# Patient Record
Sex: MALE | Race: OTHER | HISPANIC OR LATINO | URBAN - METROPOLITAN AREA
[De-identification: names, ages, dates, MRNs, and addresses within clinical notes are randomized per-mention and may not be internally consistent; named-entity substitution may affect disease eponyms.]

---

## 2019-11-04 ENCOUNTER — EMERGENCY (EMERGENCY)
Facility: HOSPITAL | Age: 49
LOS: 0 days | Discharge: HOME | End: 2019-11-04
Attending: EMERGENCY MEDICINE | Admitting: EMERGENCY MEDICINE
Payer: COMMERCIAL

## 2019-11-04 VITALS
OXYGEN SATURATION: 98 % | SYSTOLIC BLOOD PRESSURE: 174 MMHG | RESPIRATION RATE: 20 BRPM | HEART RATE: 76 BPM | TEMPERATURE: 98 F | DIASTOLIC BLOOD PRESSURE: 91 MMHG

## 2019-11-04 DIAGNOSIS — M25.522 PAIN IN LEFT ELBOW: ICD-10-CM

## 2019-11-04 DIAGNOSIS — M25.422 EFFUSION, LEFT ELBOW: ICD-10-CM

## 2019-11-04 DIAGNOSIS — M79.603 PAIN IN ARM, UNSPECIFIED: ICD-10-CM

## 2019-11-04 PROCEDURE — 99283 EMERGENCY DEPT VISIT LOW MDM: CPT

## 2019-11-04 PROCEDURE — 73080 X-RAY EXAM OF ELBOW: CPT | Mod: 26,LT

## 2019-11-04 NOTE — ED PROVIDER NOTE - OBJECTIVE STATEMENT
49yM pmhx DM, DANAE c/o LT elbow pain x2 wks; he states he has not had trauma to the area in the immediate past but 5mos ago he hit the elbow on concrete, had mild pain at the time that went away. Pt denies numbness in the distal UE, weakness, shoulder pain, wrist pain. Denies fever/chills, 49yM pmhx DM, DANAE c/o LT elbow pain x2 wks; he states he has not had trauma to the area in the immediate past but 5mos ago he hit the elbow on concrete, had mild pain at the time that went away. Also states he just noticed swelling of the elbow in the past few days. Pt denies numbness in the distal UE, weakness, shoulder pain, wrist pain; denies fever/chills.

## 2019-11-04 NOTE — ED PROVIDER NOTE - CARE PROVIDER_API CALL
Frances Vanegas (MD)  [facility]  [address] [place] [address]  Phone: [phone]  Fax: [phone]  Follow Up Time: Routine

## 2019-11-04 NOTE — ED PROVIDER NOTE - PHYSICAL EXAMINATION
Vital Signs: Reviewed  GEN: alert, NAD  HEAD:  normocephalic, atraumatic  EYES:  PERRLA; conjunctivae without injection, drainage or discharge  ENMT:  nasal mucosa moist; mouth moist without ulcerations or lesions; throat moist without erythema, exudate, ulcerations or lesions  NECK:  supple, no masses  CARDIAC:  regular rate, normal S1 and S2, no murmurs, rubs or gallops  RESP:  respiratory rate and effort appear normal for age; lungs are clear to auscultation bilaterally; no rales or wheezes  ABDOMEN:  soft, nontender, nondistended  MUSCULOSKELETAL/NEURO:  normal movement, normal tone; LT elbow tender to palpation over lateral olecranon mildly tender posterior and medial elbow, FROM, strength 5/5, sensation intact; FROM LT shoulder and wrist. Mild edema to posterior LT elbow, no redness no warmth no tenderness over edema.   SKIN:  normal skin color for age and race, well-perfused; warm and dry

## 2019-11-04 NOTE — ED PROVIDER NOTE - ATTENDING CONTRIBUTION TO CARE
49yM p/w L elbow pain/swelling x 2wk - atraumatic - full ROM/use of LUE, but c/o pain when he touches swollen elbow.  No fevers, streaking, erythema.  No forearm sx.  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    exam w/ small swelling to olecranon bursa, mobile, no erythema/warmth, full elbow ROM, strength, sensation and perfusion w/o tenderness to ROM

## 2019-11-04 NOTE — ED PROVIDER NOTE - NS ED ROS FT
Review of Systems:  	•	CONSTITUTIONAL - no fever, no diaphoresis  	•	SKIN - no rash, no lesions  	•	HEMATOLOGIC - no bleeding, no bruising  	•	EYES - no discharge, no injection  	•	ENT - no otorrhea, no rhinorrhea  	•	RESPIRATORY - no shortness of breath, no cough  	•	CARDIAC - no chest pain, no palpitations  	•	GI - no nausea, no vomiting, no diarrhea  	•	GENITO-URINARY - no dysuria, no hematuria  	•	MUSCULOSKELETAL - +joint paint, +swelling, no redness  	•	NEUROLOGIC - no weakness, no anesthesia, no paresthesias

## 2019-11-04 NOTE — ED PROVIDER NOTE - PATIENT PORTAL LINK FT
You can access the FollowMyHealth Patient Portal offered by [facility] by registering at the following website: [email or web address]. By joining [facility]’s FollowMyHealth portal, you will also be able to view your health information using other applications (apps) compatible with our system.

## 2019-11-04 NOTE — ED PROVIDER NOTE - CLINICAL SUMMARY MEDICAL DECISION MAKING FREE TEXT BOX
49yM p/w L elbow swelling, likely olecranon bursitis.  Xray, pain meds, ACE wrap for compression dressing, reviewed home care/supportive care, refer to ortho for f/u if not improving.

## 2019-11-04 NOTE — ED ADULT NURSE NOTE - OBJECTIVE STATEMENT
pt c/o left elbow and foream pain - s/s of pain to the touch - afebrile - pain started approx 2 weeks ago - swelling started today.

## 2021-04-28 PROBLEM — E11.9 TYPE 2 DIABETES MELLITUS WITHOUT COMPLICATIONS: Chronic | Status: ACTIVE | Noted: 2019-11-04

## 2021-04-28 PROBLEM — G47.33 OBSTRUCTIVE SLEEP APNEA (ADULT) (PEDIATRIC): Chronic | Status: ACTIVE | Noted: 2019-11-04

## 2021-05-04 ENCOUNTER — APPOINTMENT (OUTPATIENT)
Dept: CARDIOLOGY | Facility: CLINIC | Age: 51
End: 2021-05-04
Payer: MEDICARE

## 2021-05-04 ENCOUNTER — RESULT CHARGE (OUTPATIENT)
Age: 51
End: 2021-05-04

## 2021-05-04 VITALS
TEMPERATURE: 97.9 F | HEART RATE: 109 BPM | DIASTOLIC BLOOD PRESSURE: 70 MMHG | HEIGHT: 66 IN | SYSTOLIC BLOOD PRESSURE: 120 MMHG | WEIGHT: 185 LBS | BODY MASS INDEX: 29.73 KG/M2

## 2021-05-04 VITALS
TEMPERATURE: 97.9 F | BODY MASS INDEX: 29.73 KG/M2 | WEIGHT: 185 LBS | DIASTOLIC BLOOD PRESSURE: 70 MMHG | HEIGHT: 66 IN | HEART RATE: 109 BPM | SYSTOLIC BLOOD PRESSURE: 120 MMHG

## 2021-05-04 VITALS — DIASTOLIC BLOOD PRESSURE: 70 MMHG | SYSTOLIC BLOOD PRESSURE: 120 MMHG

## 2021-05-04 DIAGNOSIS — Z83.438 FAMILY HISTORY OF OTHER DISORDER OF LIPOPROTEIN METABOLISM AND OTHER LIPIDEMIA: ICD-10-CM

## 2021-05-04 DIAGNOSIS — Z87.898 PERSONAL HISTORY OF OTHER SPECIFIED CONDITIONS: ICD-10-CM

## 2021-05-04 DIAGNOSIS — Z78.9 OTHER SPECIFIED HEALTH STATUS: ICD-10-CM

## 2021-05-04 DIAGNOSIS — Z00.00 ENCOUNTER FOR GENERAL ADULT MEDICAL EXAMINATION W/OUT ABNORMAL FINDINGS: ICD-10-CM

## 2021-05-04 PROCEDURE — 99072 ADDL SUPL MATRL&STAF TM PHE: CPT

## 2021-05-04 PROCEDURE — 99203 OFFICE O/P NEW LOW 30 MIN: CPT

## 2021-05-04 PROCEDURE — 93000 ELECTROCARDIOGRAM COMPLETE: CPT

## 2021-05-04 NOTE — PHYSICAL EXAM
[Well Developed] : well developed [Well Nourished] : well nourished [No Acute Distress] : no acute distress [Normal Conjunctiva] : normal conjunctiva [Normal Venous Pressure] : normal venous pressure [No Carotid Bruit] : no carotid bruit [Normal S1, S2] : normal S1, S2 [No Murmur] : no murmur [No Rub] : no rub [No Gallop] : no gallop [Clear Lung Fields] : clear lung fields [Good Air Entry] : good air entry [No Respiratory Distress] : no respiratory distress  [Soft] : abdomen soft [Non Tender] : non-tender [No Masses/organomegaly] : no masses/organomegaly [Normal Bowel Sounds] : normal bowel sounds [Normal Gait] : normal gait [No Edema] : no edema [No Cyanosis] : no cyanosis [No Clubbing] : no clubbing [No Varicosities] : no varicosities [No Rash] : no rash [No Skin Lesions] : no skin lesions [Moves all extremities] : moves all extremities [No Focal Deficits] : no focal deficits [Normal Speech] : normal speech [Alert and Oriented] : alert and oriented [Normal memory] : normal memory [de-identified] : Fundi - disk margins are sharp, No AV nicking or increased light reflex [de-identified] : Mallampati score -

## 2021-05-04 NOTE — DISCUSSION/SUMMARY
[With Me] : with me [FreeTextEntry3] : After testing [FreeTextEntry1] : Ekg results were reviewed.\par \par Fasting CBC, BMP, LFTs, Lipid Profile, HgbA1c, CRP, UA, Urine Microalbumin, Mg, Ca, TSH, T3, T4 prior to next visit\par Echocardiogram - HTN\par Low salt diet\par f/u after testing\par \par Number/complexity of problems -  Mod - 2 or more stable chronic illnesses\par Amount/complexity of data -history, exam, ekg reviewed\par Risk of complications - Mild\par

## 2021-05-04 NOTE — HISTORY OF PRESENT ILLNESS
[FreeTextEntry1] : 50 year old male came in for evaluation of HTN since 12/2020.  He was told on two occasions he has had BP or 130/90 mm. Hg.  He has also checked it at home with similar readings.  The patient has had chest pains for the last 7 months.  The pain is not associated with activity.  It lasts usually less than 20 sec.  The pain is midsternal and nonradiating and non-pleuritic.  The patient states he is always short of breath.  He also has sleep apnea.  The patient denies palpitations, dizziness, loss of consciousness, or swelling of the ankles.\par \par The patient never smoked or vaped.  The patient denies substance abuse.  The patient has HTN,   pre-DM, and  high cholesterol.  The patient has been diagnosed with DANAE.\par \par PMHX;\par \par HTN - recent\par High cholesterol - stable\par Pre-DM - stable\par DANAE - stable\par Chest pains - atypical - stable\par Shortness of breath - stable\par \par Medications reviewed and reconciled with patient.  Patient is compliant with taking appropriate medications at appropriate doses at appropriate intervals without missing doses.\par \par \par

## 2021-05-04 NOTE — ASSESSMENT
[FreeTextEntry1] : HTN - recent\par High cholesterol - stable\par Pre-DM - stable\par DANAE - stable\par Chest pains - atypical - stable\par Shortness of breath - stable

## 2021-05-13 ENCOUNTER — LABORATORY RESULT (OUTPATIENT)
Age: 51
End: 2021-05-13

## 2021-05-13 LAB
ALBUMIN SERPL ELPH-MCNC: 5.2 G/DL
ALP BLD-CCNC: 100 U/L
ALT SERPL-CCNC: 45 U/L
ANION GAP SERPL CALC-SCNC: 13 MMOL/L
AST SERPL-CCNC: 34 U/L
BASOPHILS # BLD AUTO: 0.05 K/UL
BASOPHILS NFR BLD AUTO: 0.7 %
BILIRUB DIRECT SERPL-MCNC: <0.2 MG/DL
BILIRUB INDIRECT SERPL-MCNC: >0.5 MG/DL
BILIRUB SERPL-MCNC: 0.7 MG/DL
BUN SERPL-MCNC: 17 MG/DL
CALCIUM SERPL-MCNC: 9.8 MG/DL
CHLORIDE SERPL-SCNC: 95 MMOL/L
CHOLEST SERPL-MCNC: 236 MG/DL
CO2 SERPL-SCNC: 27 MMOL/L
CREAT SERPL-MCNC: 1 MG/DL
EOSINOPHIL # BLD AUTO: 0.41 K/UL
EOSINOPHIL NFR BLD AUTO: 6.1 %
ESTIMATED AVERAGE GLUCOSE: 272 MG/DL
GLUCOSE SERPL-MCNC: 231 MG/DL
HBA1C MFR BLD HPLC: 11.1 %
HCT VFR BLD CALC: 46.1 %
HDLC SERPL-MCNC: 39 MG/DL
HGB BLD-MCNC: 15.9 G/DL
IMM GRANULOCYTES NFR BLD AUTO: 0.3 %
LDLC SERPL CALC-MCNC: 148 MG/DL
LYMPHOCYTES # BLD AUTO: 2.19 K/UL
LYMPHOCYTES NFR BLD AUTO: 32.7 %
MAGNESIUM SERPL-MCNC: 2 MG/DL
MAN DIFF?: NORMAL
MCHC RBC-ENTMCNC: 30.5 PG
MCHC RBC-ENTMCNC: 34.5 G/DL
MCV RBC AUTO: 88.3 FL
MONOCYTES # BLD AUTO: 0.57 K/UL
MONOCYTES NFR BLD AUTO: 8.5 %
NEUTROPHILS # BLD AUTO: 3.45 K/UL
NEUTROPHILS NFR BLD AUTO: 51.7 %
NONHDLC SERPL-MCNC: 197 MG/DL
PLATELET # BLD AUTO: 250 K/UL
POTASSIUM SERPL-SCNC: 4.5 MMOL/L
PROT SERPL-MCNC: 7.7 G/DL
RBC # BLD: 5.22 M/UL
RBC # FLD: 12 %
SODIUM SERPL-SCNC: 135 MMOL/L
TRIGL SERPL-MCNC: 299 MG/DL
WBC # FLD AUTO: 6.69 K/UL

## 2021-05-14 LAB
CREAT SPEC-SCNC: 248 MG/DL
CRP SERPL HS-MCNC: 4.49 MG/L
MICROALBUMIN 24H UR DL<=1MG/L-MCNC: 7.4 MG/DL
MICROALBUMIN/CREAT 24H UR-RTO: 30 MG/G
T3 SERPL-MCNC: 114 NG/DL
T4 SERPL-MCNC: 5.9 UG/DL
TSH SERPL-ACNC: 3.63 UIU/ML

## 2021-05-20 ENCOUNTER — APPOINTMENT (OUTPATIENT)
Dept: CARDIOLOGY | Facility: CLINIC | Age: 51
End: 2021-05-20
Payer: MEDICARE

## 2021-05-20 PROCEDURE — 93306 TTE W/DOPPLER COMPLETE: CPT

## 2021-06-03 ENCOUNTER — APPOINTMENT (OUTPATIENT)
Dept: CARDIOLOGY | Facility: CLINIC | Age: 51
End: 2021-06-03
Payer: MEDICARE

## 2021-06-03 VITALS
TEMPERATURE: 97.7 F | WEIGHT: 185 LBS | HEIGHT: 66 IN | RESPIRATION RATE: 16 BRPM | DIASTOLIC BLOOD PRESSURE: 84 MMHG | HEART RATE: 79 BPM | BODY MASS INDEX: 29.73 KG/M2 | OXYGEN SATURATION: 98 % | SYSTOLIC BLOOD PRESSURE: 120 MMHG

## 2021-06-03 DIAGNOSIS — R73.03 PREDIABETES.: ICD-10-CM

## 2021-06-03 PROCEDURE — 99213 OFFICE O/P EST LOW 20 MIN: CPT

## 2021-06-03 RX ORDER — BENZOCAINE/BENZALKONIUM/ALOE/E 5 %-0.13 %
10 CREAM (GRAM) TOPICAL
Refills: 0 | Status: DISCONTINUED | COMMUNITY
End: 2021-06-03

## 2021-06-03 RX ORDER — OMEGA-3-ACID ETHYL ESTERS CAPSULES 1 G/1
1 CAPSULE, LIQUID FILLED ORAL TWICE DAILY
Refills: 0 | Status: ACTIVE | COMMUNITY
Start: 2021-04-28

## 2021-06-03 NOTE — ASSESSMENT
[FreeTextEntry1] : \par HTN - controlled\par High cholesterol - uncontrolled\par DM - uncontrolled\par DANAE - stable\par Chest pains - atypical - stable\par Shortness of breath - stable

## 2021-06-03 NOTE — DISCUSSION/SUMMARY
[With Me] : with me [___ Month(s)] : in [unfilled] month(s) [FreeTextEntry1] : Recent lab results were reviewed.\par Echocardiogram results were reviewed.\par \par Patient scheduled to see endocrine next week for DM\par Low fat, low carbohydrate diet - ADA 1500 margarita diet\par Fasting CBC, BMP, LFTs, Lipid Profile, AiC prior to next visit\par f/u 3 months - possible start statin once glucose is controlled\par \par \par Number/complexity of problems -  Mod - 2 or more stable chronic illnesses\par Amount/complexity of data -history, exam, reviewed old note, labs reviewed, echo reviewed\par Risk of complications - Low\par

## 2021-06-03 NOTE — HISTORY OF PRESENT ILLNESS
[FreeTextEntry1] : 50 year old male came in for evaluation of HTN since 12/2020.  He was told on two occasions he has had BP or 130/90 mm. Hg.  He has also checked it at home with similar readings.  The patient has had chest pains for the last 7 months.  The pain is not associated with activity.  It lasts usually less than 20 sec.  The pain is midsternal and nonradiating and non-pleuritic.  The patient states he is always short of breath.  He also has sleep apnea.  The patient denies palpitations, dizziness, loss of consciousness, or swelling of the ankles.\par \par The patient never smoked or vaped.  The patient denies substance abuse.  The patient has HTN,   pre-DM, and  high cholesterol.  The patient has been diagnosed with DANAE.\par \par PMHX;\par \par HTN - controlled\par High cholesterol - uncontrolled\par DM - uncontrolled\par DANAE - stable\par Chest pains - atypical - stable\par Shortness of breath - stable\par \par Medications reviewed and reconciled with patient.  Patient is compliant with taking appropriate medications at appropriate doses at appropriate intervals without missing doses.\par \par \par

## 2021-06-04 ENCOUNTER — TRANSCRIPTION ENCOUNTER (OUTPATIENT)
Age: 51
End: 2021-06-04

## 2021-09-16 ENCOUNTER — LABORATORY RESULT (OUTPATIENT)
Age: 51
End: 2021-09-16

## 2021-09-30 ENCOUNTER — APPOINTMENT (OUTPATIENT)
Dept: CARDIOLOGY | Facility: CLINIC | Age: 51
End: 2021-09-30
Payer: COMMERCIAL

## 2021-09-30 PROCEDURE — 99213 OFFICE O/P EST LOW 20 MIN: CPT

## 2021-09-30 PROCEDURE — 93000 ELECTROCARDIOGRAM COMPLETE: CPT

## 2021-09-30 RX ORDER — EZETIMIBE AND SIMVASTATIN 10; 10 MG/1; MG/1
10-10 TABLET ORAL DAILY
Qty: 90 | Refills: 0 | Status: ACTIVE | COMMUNITY
Start: 2021-09-30

## 2021-09-30 RX ORDER — PIOGLITAZONE HYDROCHLORIDE 30 MG/1
30 TABLET ORAL DAILY
Refills: 0 | Status: ACTIVE | COMMUNITY

## 2021-09-30 NOTE — HISTORY OF PRESENT ILLNESS
[FreeTextEntry1] : 51 year old male came in for evaluation of HTN since 12/2020.  He was told on two occasions he has had BP or 130/90 mm. Hg.  He has also checked it at home with similar readings.  The patient has had chest pains for the last 7 months.  The pain is not associated with activity.  It lasts usually less than 20 sec.  The pain is midsternal and nonradiating and non-pleuritic.  The patient states he is always short of breath.  He also has sleep apnea.  The patient denies palpitations, dizziness, loss of consciousness, or swelling of the ankles.\par \par The patient never smoked or vaped.  The patient denies substance abuse.  The patient has HTN,   pre-DM, and  high cholesterol.  The patient has been diagnosed with DANAE.\par \par PMHX;\par \par HTN - controlled\par High cholesterol - uncontrolled\par DM - uncontrolled\par DANAE - stable\par Chest pains - atypical - stable\par Shortness of breath - stable\par \par Medications reviewed and reconciled with patient.  Patient is compliant with taking appropriate medications at appropriate doses at appropriate intervals without missing doses.\par \par \par

## 2021-09-30 NOTE — DISCUSSION/SUMMARY
[With Me] : with me [___ Month(s)] : in [unfilled] month(s) [FreeTextEntry1] : Last visit note reviewed.\par Recent lab results were reviewed.\par Ekg results were reviewed.\par \par Fasting CBC, BMP, LFTs, Lipid Profile, HgbA1c, CRP, UA, Urine Microalbumin, Mg, Ca, TSH, T3, T4 prior to next visit\par f/u 6 months\par \par Number/complexity of problems -  Mod - 2 or more stable chronic illnesses\par Amount/complexity of data -history, exam, reviewed old note, labs reviewed, Ekg reviewed\par Risk of complications - Low\par

## 2022-03-30 ENCOUNTER — LABORATORY RESULT (OUTPATIENT)
Age: 52
End: 2022-03-30

## 2022-04-20 ENCOUNTER — APPOINTMENT (OUTPATIENT)
Dept: CARDIOLOGY | Facility: CLINIC | Age: 52
End: 2022-04-20
Payer: COMMERCIAL

## 2022-04-20 VITALS
TEMPERATURE: 97.2 F | RESPIRATION RATE: 16 BRPM | HEIGHT: 66 IN | OXYGEN SATURATION: 96 % | BODY MASS INDEX: 30.86 KG/M2 | WEIGHT: 192 LBS | SYSTOLIC BLOOD PRESSURE: 132 MMHG | DIASTOLIC BLOOD PRESSURE: 80 MMHG | HEART RATE: 69 BPM

## 2022-04-20 VITALS — DIASTOLIC BLOOD PRESSURE: 80 MMHG | SYSTOLIC BLOOD PRESSURE: 140 MMHG

## 2022-04-20 PROCEDURE — 99213 OFFICE O/P EST LOW 20 MIN: CPT

## 2022-04-20 PROCEDURE — 93000 ELECTROCARDIOGRAM COMPLETE: CPT

## 2022-04-20 NOTE — ASSESSMENT
[FreeTextEntry1] : HTN - controlled\par High cholesterol - controlled\par DM - controlled\par DANAE - stable\par Chest pains - atypical - stable\par Shortness of breath - stable

## 2022-04-20 NOTE — HISTORY OF PRESENT ILLNESS
[FreeTextEntry1] : 51 year old male came in for evaluation of HTN since 12/2020.  He was told on two occasions he has had BP or 130/90 mm. Hg.  He has also checked it at home with similar readings.  The patient has had chest pains for the last 7 months.  The pain is not associated with activity.  It lasts usually less than 20 sec.  The pain is midsternal and nonradiating and non-pleuritic.  The patient states he is always short of breath.  He also has sleep apnea.  The patient denies palpitations, dizziness, loss of consciousness, or swelling of the ankles.\par \par The patient never smoked or vaped.  The patient denies substance abuse.  The patient has HTN,   pre-DM, and  high cholesterol.  The patient has been diagnosed with DANAE.\par \par PMHX;\par \par HTN - uncontrolled\par High cholesterol - controlled\par DM - controlled\par DANAE - stable\par Chest pains - atypical - stable\par Shortness of breath - stable\par \par Medications reviewed and reconciled with patient.  Patient is compliant with taking appropriate medications at appropriate doses at appropriate intervals without missing doses.  He had not taken his Losartan-HCTZ for over a month due to his proscription running out.\par \par \par

## 2022-04-20 NOTE — DISCUSSION/SUMMARY
[With Me] : with me [___ Month(s)] : in [unfilled] month(s) [FreeTextEntry1] : Last visit note reviewed.\par Recent lab results were reviewed.(including  CBC, BMP, LFTs, Lipid Profile, HgbA1c, CRP, UA, Urine Microalbumin, Mg, Ca, TSH, T3, T4).\par Ekg was performed and interpreted. (NSR, IRBBB, Borderline LVH, LAD)\par \par Restart Losartan-HCTZ\par f/u 1 month\par \par Number/complexity of problems - Mod - 2 or more chronic stable illnesses\par Amount/complexity of data -history, exam, reviewed old note, labs (see above), reviewed, ekg reviewed\par Medication modification performed\par Risk of complications - Low\par

## 2022-06-13 ENCOUNTER — APPOINTMENT (OUTPATIENT)
Dept: CARDIOLOGY | Facility: CLINIC | Age: 52
End: 2022-06-13
Payer: COMMERCIAL

## 2022-06-13 VITALS
DIASTOLIC BLOOD PRESSURE: 70 MMHG | TEMPERATURE: 97.3 F | HEART RATE: 91 BPM | WEIGHT: 196 LBS | SYSTOLIC BLOOD PRESSURE: 110 MMHG | HEIGHT: 66 IN | BODY MASS INDEX: 31.5 KG/M2 | OXYGEN SATURATION: 98 % | RESPIRATION RATE: 16 BRPM

## 2022-06-13 DIAGNOSIS — R07.9 CHEST PAIN, UNSPECIFIED: ICD-10-CM

## 2022-06-13 DIAGNOSIS — R06.02 SHORTNESS OF BREATH: ICD-10-CM

## 2022-06-13 PROCEDURE — 99213 OFFICE O/P EST LOW 20 MIN: CPT

## 2022-06-13 RX ORDER — CANAGLIFLOZIN 300 MG/1
300 TABLET, FILM COATED ORAL
Refills: 0 | Status: DISCONTINUED | COMMUNITY
End: 2022-06-13

## 2022-06-13 RX ORDER — BLOOD SUGAR DIAGNOSTIC
STRIP MISCELLANEOUS
Qty: 100 | Refills: 0 | Status: ACTIVE | COMMUNITY
Start: 2022-04-13

## 2022-06-13 NOTE — DISCUSSION/SUMMARY
[With Me] : with me [___ Month(s)] : in [unfilled] month(s) [FreeTextEntry1] : Last visit note reviewed.\par Recent lab results were reviewed.(including  CBC, BMP, LFTs, Lipid Profile, HgbA1c, CRP, UA, Urine Microalbumin, Mg, Ca, TSH, T3, T4).\par \par \par Fasting CBC, BMP, LFTs, Lipid Profile, HgbA1c, CRP, UA, Urine Microalbumin, Mg, Ca, TSH, T3, T4 prior to next visit\par f/u 6 months\par \par \par Number/complexity of problems - Mod - 2 or more chronic stable illnesses\par Amount/complexity of data -history, exam, reviewed old note, labs (see above), reviewed\par Risk of complications - Low\par \par \par

## 2022-06-13 NOTE — HISTORY OF PRESENT ILLNESS
[FreeTextEntry1] : 51 year old male came in for evaluation of HTN since 12/2020.  He was told on two occasions he has had BP or 130/90 mm. Hg.  He has also checked it at home with similar readings.  The patient has had chest pains for the last 7 months.  The pain is not associated with activity.  It lasts usually less than 20 sec.  The pain is midsternal and nonradiating and non-pleuritic.  The patient states he is always short of breath.  He also has sleep apnea.  The patient denies palpitations, dizziness, loss of consciousness, or swelling of the ankles.\par \par The patient never smoked or vaped.  The patient denies substance abuse.  The patient has HTN,   pre-DM, and  high cholesterol.  The patient has been diagnosed with DANAE.\par \par The patient is back on his medications with his BP improved.\par \par PMHX;\par \par HTN - controlled\par High cholesterol - controlled\par DM - controlled\par DANAE - stable\par Chest pains - atypical - stable\par Shortness of breath - stable\par \par Medications reviewed and reconciled with patient.  Patient is compliant with taking appropriate medications at appropriate doses at appropriate intervals without missing doses.  He had not taken his Losartan-HCTZ for over a month due to his proscription running out.\par \par \par

## 2022-12-19 ENCOUNTER — APPOINTMENT (OUTPATIENT)
Dept: CARDIOLOGY | Facility: CLINIC | Age: 52
End: 2022-12-19

## 2023-05-12 ENCOUNTER — APPOINTMENT (OUTPATIENT)
Dept: CARDIOLOGY | Facility: CLINIC | Age: 53
End: 2023-05-12
Payer: COMMERCIAL

## 2023-05-12 ENCOUNTER — RESULT CHARGE (OUTPATIENT)
Age: 53
End: 2023-05-12

## 2023-05-12 VITALS
WEIGHT: 196 LBS | SYSTOLIC BLOOD PRESSURE: 110 MMHG | BODY MASS INDEX: 30.76 KG/M2 | HEIGHT: 67 IN | TEMPERATURE: 97.4 F | HEART RATE: 89 BPM | DIASTOLIC BLOOD PRESSURE: 75 MMHG

## 2023-05-12 DIAGNOSIS — I45.10 UNSPECIFIED RIGHT BUNDLE-BRANCH BLOCK: ICD-10-CM

## 2023-05-12 DIAGNOSIS — E78.5 HYPERLIPIDEMIA, UNSPECIFIED: ICD-10-CM

## 2023-05-12 PROCEDURE — 99214 OFFICE O/P EST MOD 30 MIN: CPT | Mod: 25

## 2023-05-12 PROCEDURE — 93000 ELECTROCARDIOGRAM COMPLETE: CPT

## 2023-05-12 RX ORDER — OMEGA-3-ACID ETHYL ESTERS 1 G/1
1 CAPSULE, LIQUID FILLED ORAL DAILY
Refills: 0 | Status: ACTIVE | COMMUNITY

## 2023-05-12 RX ORDER — DAPAGLIFLOZIN 10 MG/1
10 TABLET, FILM COATED ORAL DAILY
Refills: 0 | Status: ACTIVE | COMMUNITY

## 2023-05-12 RX ORDER — METFORMIN HYDROCHLORIDE 500 MG/1
500 TABLET, COATED ORAL
Refills: 0 | Status: ACTIVE | COMMUNITY

## 2023-05-12 RX ORDER — LOSARTAN POTASSIUM AND HYDROCHLOROTHIAZIDE 12.5; 5 MG/1; MG/1
50-12.5 TABLET ORAL DAILY
Qty: 90 | Refills: 3 | Status: ACTIVE | COMMUNITY
Start: 2021-04-28 | End: 1900-01-01

## 2023-05-12 RX ORDER — UBIDECARENONE 200 MG
200 CAPSULE ORAL
Refills: 0 | Status: ACTIVE | COMMUNITY

## 2023-05-12 NOTE — ASSESSMENT
[FreeTextEntry1] : Mr. AMY SHEA is a 52 year old man with PMHx of HTN, HLD, NIDDM who is presenting for follow up. \par \par -Reviewed history, TTE, CCTA, labs and ECG\par -Lipids are borderline, LDL in the 100-120s.\par -Order TTE to rule out structural changes\par -Order CAC score to assess plaque burden and further evaluate risk of ASCVD. Advised patient that the cost is $100.\par -BP well controlled, continue losartan-HCTZ 50-12.5 daily\par -Continue simvastatin-ezetemibe 10-10 daily for now\par -Follows with endocrinologist for DM regimen\par \par -Encouraged improved lifestyle modifications with these recommendations below:\par -Plant-based and Mediterranean diets, along with increased fruit, nut, vegetable, legume, and lean vegetable or animal protein (preferably fish) consumption\par -Engage in at least 150 minutes per week of accumulated moderate-intensity aerobic physical activity or 75 minutes per week of vigorous-intensity aerobic physical activity\par \par Pasquale Vizcaino MD, FACC\par Non-Invasive Cardiology\par [facility]\par [facility]\par [address]\par Office: [phone]\par

## 2023-05-12 NOTE — REASON FOR VISIT
[Symptom and Test Evaluation] : symptom and test evaluation [Hypertension] : hypertension [FreeTextEntry1] : Mr. AMY SHEA is a 52 year old man with PMHx of HTN, HLD, NIDDM who is presenting for follow up. He previously followed with Dr Cheng. He feels well today and has no concerns. He has no chest pain, SOB or palpitations. He has no limitations with exertion.\par \par He had a negative CCTA in 2010. He had an allergic reaction to the contrast.\par \par TTE 5/20/21\par Summary:\par 1.Normal global left ventricular systolic function.\par 2.Normal left ventricular internal cavity size.\par 3.The left ventricular ejection fraction calculated by 3D/4D is 60 %.\par 4.Mean global longitudinal strain is -15.5 %, indicating decreased systolic function.\par \par

## 2023-07-11 ENCOUNTER — RESULT REVIEW (OUTPATIENT)
Age: 53
End: 2023-07-11

## 2023-07-11 ENCOUNTER — OUTPATIENT (OUTPATIENT)
Dept: OUTPATIENT SERVICES | Facility: HOSPITAL | Age: 53
LOS: 1 days | End: 2023-07-11
Payer: COMMERCIAL

## 2023-07-11 DIAGNOSIS — Z00.8 ENCOUNTER FOR OTHER GENERAL EXAMINATION: ICD-10-CM

## 2023-07-11 DIAGNOSIS — E78.5 HYPERLIPIDEMIA, UNSPECIFIED: ICD-10-CM

## 2023-07-11 PROCEDURE — 75571 CT HRT W/O DYE W/CA TEST: CPT | Mod: 26

## 2023-07-11 PROCEDURE — 75571 CT HRT W/O DYE W/CA TEST: CPT

## 2023-07-12 DIAGNOSIS — E78.5 HYPERLIPIDEMIA, UNSPECIFIED: ICD-10-CM

## 2023-07-25 ENCOUNTER — APPOINTMENT (OUTPATIENT)
Dept: CARDIOLOGY | Facility: CLINIC | Age: 53
End: 2023-07-25
Payer: COMMERCIAL

## 2023-07-25 PROCEDURE — 93306 TTE W/DOPPLER COMPLETE: CPT

## 2023-11-21 NOTE — ED PROVIDER NOTE - NS ED ATTENDING STATEMENT MOD
I have personally seen and examined this patient.  I have fully participated in the care of this patient. I have reviewed all pertinent clinical information, including history, physical exam, plan and the Resident’s note and agree except as noted. Dressing (No Sutures): dry sterile dressing

## 2023-11-29 ENCOUNTER — APPOINTMENT (OUTPATIENT)
Dept: CARDIOLOGY | Facility: CLINIC | Age: 53
End: 2023-11-29
Payer: COMMERCIAL

## 2023-11-29 VITALS
HEIGHT: 67 IN | BODY MASS INDEX: 31.23 KG/M2 | HEART RATE: 78 BPM | WEIGHT: 199 LBS | SYSTOLIC BLOOD PRESSURE: 117 MMHG | DIASTOLIC BLOOD PRESSURE: 78 MMHG

## 2023-11-29 DIAGNOSIS — Z83.3 FAMILY HISTORY OF DIABETES MELLITUS: ICD-10-CM

## 2023-11-29 DIAGNOSIS — E11.9 TYPE 2 DIABETES MELLITUS W/OUT COMPLICATIONS: ICD-10-CM

## 2023-11-29 DIAGNOSIS — I10 ESSENTIAL (PRIMARY) HYPERTENSION: ICD-10-CM

## 2023-11-29 DIAGNOSIS — G47.33 OBSTRUCTIVE SLEEP APNEA (ADULT) (PEDIATRIC): ICD-10-CM

## 2023-11-29 DIAGNOSIS — Z82.49 FAMILY HISTORY OF ISCHEMIC HEART DISEASE AND OTHER DISEASES OF THE CIRCULATORY SYSTEM: ICD-10-CM

## 2023-11-29 PROCEDURE — 99214 OFFICE O/P EST MOD 30 MIN: CPT | Mod: 25

## 2023-11-29 PROCEDURE — 93000 ELECTROCARDIOGRAM COMPLETE: CPT

## 2024-11-20 ENCOUNTER — NON-APPOINTMENT (OUTPATIENT)
Age: 54
End: 2024-11-20

## 2024-11-20 ENCOUNTER — APPOINTMENT (OUTPATIENT)
Dept: CARDIOLOGY | Facility: CLINIC | Age: 54
End: 2024-11-20
Payer: COMMERCIAL

## 2024-11-20 ENCOUNTER — RESULT CHARGE (OUTPATIENT)
Age: 54
End: 2024-11-20

## 2024-11-20 VITALS
HEART RATE: 62 BPM | DIASTOLIC BLOOD PRESSURE: 76 MMHG | HEIGHT: 67 IN | SYSTOLIC BLOOD PRESSURE: 122 MMHG | WEIGHT: 204 LBS | BODY MASS INDEX: 32.02 KG/M2

## 2024-11-20 DIAGNOSIS — E78.5 HYPERLIPIDEMIA, UNSPECIFIED: ICD-10-CM

## 2024-11-20 DIAGNOSIS — E11.9 TYPE 2 DIABETES MELLITUS W/OUT COMPLICATIONS: ICD-10-CM

## 2024-11-20 DIAGNOSIS — I10 ESSENTIAL (PRIMARY) HYPERTENSION: ICD-10-CM

## 2024-11-20 DIAGNOSIS — I45.10 UNSPECIFIED RIGHT BUNDLE-BRANCH BLOCK: ICD-10-CM

## 2024-11-20 DIAGNOSIS — G47.33 OBSTRUCTIVE SLEEP APNEA (ADULT) (PEDIATRIC): ICD-10-CM

## 2024-11-20 PROCEDURE — G2211 COMPLEX E/M VISIT ADD ON: CPT | Mod: NC

## 2024-11-20 PROCEDURE — 93000 ELECTROCARDIOGRAM COMPLETE: CPT

## 2024-11-20 PROCEDURE — 99214 OFFICE O/P EST MOD 30 MIN: CPT

## 2024-11-20 RX ORDER — LEVOTHYROXINE SODIUM 0.17 MG/1
TABLET ORAL DAILY
Refills: 0 | Status: ACTIVE | COMMUNITY